# Patient Record
Sex: MALE | Race: OTHER | Employment: OTHER | URBAN - METROPOLITAN AREA
[De-identification: names, ages, dates, MRNs, and addresses within clinical notes are randomized per-mention and may not be internally consistent; named-entity substitution may affect disease eponyms.]

---

## 2022-11-28 NOTE — PROCEDURE NOTE: CLINICAL
PROCEDURE NOTE: Removal of Corneal FB at Slit Lamp OS. Diagnosis: Acute Pain Due to Trauma. Anesthesia: Topical. The patient, the procedure, and the correct site were identified initially. After the risks, benefits, and alternatives to the procedure were explained to the patient, informed consent was obtained. Prior to treatment, the risks/benefits/alternatives were discussed. The patient wished to proceed with procedure. Corneal foreign body was removed using a 25 gauge needle at the slit lamp. Patient tolerated procedure well. There were no complications. Post-op instructions given. Ramon Lott

## 2024-08-19 ENCOUNTER — CONSULTATION/EVALUATION (OUTPATIENT)
Dept: URBAN - METROPOLITAN AREA CLINIC 39 | Facility: CLINIC | Age: 79
End: 2024-08-19

## 2024-08-19 DIAGNOSIS — H25.813: ICD-10-CM

## 2024-08-19 DIAGNOSIS — E11.9: ICD-10-CM

## 2024-08-19 DIAGNOSIS — H35.363: ICD-10-CM

## 2024-08-19 PROCEDURE — 99214 OFFICE O/P EST MOD 30 MIN: CPT

## 2024-08-19 PROCEDURE — 92134 CPTRZ OPH DX IMG PST SGM RTA: CPT

## 2024-08-19 PROCEDURE — 92136 OPHTHALMIC BIOMETRY: CPT

## 2024-08-19 RX ORDER — MOXIFLOXACIN OPHTHALMIC 5 MG/ML: 1 SOLUTION/ DROPS OPHTHALMIC

## 2024-08-19 RX ORDER — PREDNISOLONE ACETATE 10 MG/ML: 1 SUSPENSION/ DROPS OPHTHALMIC

## 2024-08-19 RX ORDER — KETOROLAC TROMETHAMINE 5 MG/ML: 1 SOLUTION OPHTHALMIC

## 2024-08-19 ASSESSMENT — VISUAL ACUITY
OS_SC: 20/60
OD_CC: J<12
OS_CC: 20/40-2
OD_SC: J<12
OS_SC: J10
OS_CC: J6

## 2024-08-19 ASSESSMENT — TONOMETRY
OD_IOP_MMHG: 14
OS_IOP_MMHG: 14

## 2024-08-23 ENCOUNTER — CONSULTATION/EVALUATION (OUTPATIENT)
Dept: URBAN - METROPOLITAN AREA CLINIC 39 | Facility: CLINIC | Age: 79
End: 2024-08-23

## 2024-08-23 DIAGNOSIS — H25.812: ICD-10-CM

## 2024-08-23 DIAGNOSIS — H35.033: ICD-10-CM

## 2024-08-23 DIAGNOSIS — H35.3121: ICD-10-CM

## 2024-08-23 DIAGNOSIS — H01.003: ICD-10-CM

## 2024-08-23 DIAGNOSIS — H35.363: ICD-10-CM

## 2024-08-23 DIAGNOSIS — H01.006: ICD-10-CM

## 2024-08-23 DIAGNOSIS — H25.813: ICD-10-CM

## 2024-08-23 DIAGNOSIS — H35.3211: ICD-10-CM

## 2024-08-23 DIAGNOSIS — E11.9: ICD-10-CM

## 2024-08-23 PROCEDURE — 92242 FLUORESCEIN&ICG ANGIOGRAPHY: CPT

## 2024-08-23 PROCEDURE — 99214 OFFICE O/P EST MOD 30 MIN: CPT | Mod: 25

## 2024-08-23 PROCEDURE — 92250 FUNDUS PHOTOGRAPHY W/I&R: CPT

## 2024-08-23 PROCEDURE — 92134 CPTRZ OPH DX IMG PST SGM RTA: CPT

## 2024-08-23 PROCEDURE — 67028 INJECTION EYE DRUG: CPT

## 2024-08-23 PROCEDURE — J3490AVA AVASTIN *

## 2024-08-23 ASSESSMENT — VISUAL ACUITY
OS_CC: 20/40-1
OD_CC: CF 2FT

## 2024-08-23 ASSESSMENT — TONOMETRY
OS_IOP_MMHG: 13
OD_IOP_MMHG: 14

## 2024-10-22 ENCOUNTER — CLINIC PROCEDURE ONLY (OUTPATIENT)
Dept: URBAN - METROPOLITAN AREA CLINIC 39 | Facility: CLINIC | Age: 79
End: 2024-10-22

## 2024-10-22 DIAGNOSIS — H35.731: ICD-10-CM

## 2024-10-22 DIAGNOSIS — H35.3211: ICD-10-CM

## 2024-10-22 PROCEDURE — 67028 INJECTION EYE DRUG: CPT

## 2024-10-22 PROCEDURE — 92134 CPTRZ OPH DX IMG PST SGM RTA: CPT

## 2024-10-22 PROCEDURE — J3490AVA AVASTIN *

## 2024-12-03 ENCOUNTER — FOLLOW UP WITH CLINIC PROCEDURE (OUTPATIENT)
Age: 79
End: 2024-12-03

## 2024-12-03 DIAGNOSIS — H35.722: ICD-10-CM

## 2024-12-03 DIAGNOSIS — H35.731: ICD-10-CM

## 2024-12-03 DIAGNOSIS — H35.033: ICD-10-CM

## 2024-12-03 DIAGNOSIS — H35.3211: ICD-10-CM

## 2024-12-03 DIAGNOSIS — H35.363: ICD-10-CM

## 2024-12-03 DIAGNOSIS — H35.3121: ICD-10-CM

## 2024-12-03 DIAGNOSIS — H35.09: ICD-10-CM

## 2024-12-03 PROCEDURE — 92273 FULL FIELD ERG W/I&R: CPT

## 2024-12-03 PROCEDURE — 99213 OFFICE O/P EST LOW 20 MIN: CPT | Mod: 25

## 2024-12-03 PROCEDURE — 92250 FUNDUS PHOTOGRAPHY W/I&R: CPT

## 2024-12-03 PROCEDURE — 67028 INJECTION EYE DRUG: CPT

## 2024-12-03 PROCEDURE — J3490AVA AVASTIN *: Mod: JZ,RT

## 2024-12-12 ENCOUNTER — CONSULTATION/EVALUATION (OUTPATIENT)
Age: 79
End: 2024-12-12

## 2024-12-12 DIAGNOSIS — H35.722: ICD-10-CM

## 2024-12-12 DIAGNOSIS — H25.812: ICD-10-CM

## 2024-12-12 DIAGNOSIS — H35.09: ICD-10-CM

## 2024-12-12 DIAGNOSIS — H25.813: ICD-10-CM

## 2024-12-12 DIAGNOSIS — H35.363: ICD-10-CM

## 2024-12-12 DIAGNOSIS — H35.731: ICD-10-CM

## 2024-12-12 DIAGNOSIS — H35.033: ICD-10-CM

## 2024-12-12 DIAGNOSIS — H35.3121: ICD-10-CM

## 2024-12-12 DIAGNOSIS — H35.3211: ICD-10-CM

## 2024-12-12 PROCEDURE — 92136 OPHTHALMIC BIOMETRY: CPT

## 2024-12-12 PROCEDURE — 99214 OFFICE O/P EST MOD 30 MIN: CPT

## 2024-12-18 ENCOUNTER — SURGERY/PROCEDURE (OUTPATIENT)
Age: 79
End: 2024-12-18

## 2024-12-18 ENCOUNTER — PRE-OP/H&P (OUTPATIENT)
Age: 79
End: 2024-12-18

## 2024-12-18 DIAGNOSIS — H35.3211: ICD-10-CM

## 2024-12-18 DIAGNOSIS — H35.033: ICD-10-CM

## 2024-12-18 DIAGNOSIS — H25.813: ICD-10-CM

## 2024-12-18 DIAGNOSIS — H35.722: ICD-10-CM

## 2024-12-18 DIAGNOSIS — H35.3121: ICD-10-CM

## 2024-12-18 DIAGNOSIS — H25.811: ICD-10-CM

## 2024-12-18 DIAGNOSIS — H21.81: ICD-10-CM

## 2024-12-18 DIAGNOSIS — H35.09: ICD-10-CM

## 2024-12-18 DIAGNOSIS — H35.731: ICD-10-CM

## 2024-12-18 DIAGNOSIS — H25.812: ICD-10-CM

## 2024-12-18 DIAGNOSIS — H35.363: ICD-10-CM

## 2024-12-18 PROCEDURE — 99211T TECH SERVICE

## 2024-12-18 PROCEDURE — 66984 XCAPSL CTRC RMVL W/O ECP: CPT

## 2024-12-19 ENCOUNTER — POST-OP (OUTPATIENT)
Age: 79
End: 2024-12-19

## 2024-12-19 DIAGNOSIS — Z96.1: ICD-10-CM

## 2024-12-19 PROCEDURE — 99024 POSTOP FOLLOW-UP VISIT: CPT

## 2025-01-09 ENCOUNTER — PRE-OP/H&P (OUTPATIENT)
Age: 80
End: 2025-01-09

## 2025-01-09 ENCOUNTER — SURGERY/PROCEDURE (OUTPATIENT)
Age: 80
End: 2025-01-09

## 2025-01-09 DIAGNOSIS — H25.812: ICD-10-CM

## 2025-01-09 DIAGNOSIS — Z96.1: ICD-10-CM

## 2025-01-09 PROCEDURE — 66984 XCAPSL CTRC RMVL W/O ECP: CPT | Mod: 79,LT

## 2025-01-09 PROCEDURE — 99211T TECH SERVICE

## 2025-01-09 RX ORDER — PREDNISOLONE ACETATE 10 MG/ML: 1 SUSPENSION/ DROPS OPHTHALMIC

## 2025-01-10 ENCOUNTER — POST-OP (OUTPATIENT)
Age: 80
End: 2025-01-10

## 2025-01-10 DIAGNOSIS — Z96.1: ICD-10-CM

## 2025-01-10 PROCEDURE — 99024 POSTOP FOLLOW-UP VISIT: CPT

## 2025-01-28 ENCOUNTER — COMPREHENSIVE EXAM (OUTPATIENT)
Age: 80
End: 2025-01-28

## 2025-01-28 DIAGNOSIS — H35.722: ICD-10-CM

## 2025-01-28 DIAGNOSIS — H35.3211: ICD-10-CM

## 2025-01-28 DIAGNOSIS — H43.813: ICD-10-CM

## 2025-01-28 DIAGNOSIS — H35.033: ICD-10-CM

## 2025-01-28 DIAGNOSIS — Z96.1: ICD-10-CM

## 2025-01-28 DIAGNOSIS — H35.731: ICD-10-CM

## 2025-01-28 DIAGNOSIS — H35.09: ICD-10-CM

## 2025-01-28 DIAGNOSIS — H35.363: ICD-10-CM

## 2025-01-28 DIAGNOSIS — H35.3121: ICD-10-CM

## 2025-01-28 PROCEDURE — 67028 INJECTION EYE DRUG: CPT

## 2025-01-28 PROCEDURE — 99214 OFFICE O/P EST MOD 30 MIN: CPT | Mod: 24,25

## 2025-01-28 PROCEDURE — J0178PRE EYLEA PREFILLED SYRINGE: Mod: JZ,RT

## 2025-01-28 PROCEDURE — 92134 CPTRZ OPH DX IMG PST SGM RTA: CPT

## 2025-01-31 ENCOUNTER — POST-OP (OUTPATIENT)
Age: 80
End: 2025-01-31

## 2025-01-31 DIAGNOSIS — Z96.1: ICD-10-CM

## 2025-01-31 PROCEDURE — 99024 POSTOP FOLLOW-UP VISIT: CPT

## 2025-03-11 ENCOUNTER — CLINIC PROCEDURE ONLY (OUTPATIENT)
Age: 80
End: 2025-03-11

## 2025-03-11 DIAGNOSIS — H35.033: ICD-10-CM

## 2025-03-11 DIAGNOSIS — H35.3211: ICD-10-CM

## 2025-03-11 PROCEDURE — 92134 CPTRZ OPH DX IMG PST SGM RTA: CPT

## 2025-03-11 PROCEDURE — 67028 INJECTION EYE DRUG: CPT

## 2025-03-11 PROCEDURE — J0178PRE EYLEA PREFILLED SYRINGE: Mod: JZ,RT

## 2025-05-02 ENCOUNTER — FOLLOW UP (OUTPATIENT)
Age: 80
End: 2025-05-02

## 2025-05-02 DIAGNOSIS — H35.033: ICD-10-CM

## 2025-05-02 DIAGNOSIS — E11.9: ICD-10-CM

## 2025-05-02 DIAGNOSIS — H35.3211: ICD-10-CM

## 2025-05-02 DIAGNOSIS — H35.3121: ICD-10-CM

## 2025-05-02 DIAGNOSIS — H43.813: ICD-10-CM

## 2025-05-02 DIAGNOSIS — H35.722: ICD-10-CM

## 2025-05-02 DIAGNOSIS — H35.731: ICD-10-CM

## 2025-05-02 PROCEDURE — 67028 INJECTION EYE DRUG: CPT

## 2025-05-02 PROCEDURE — 92273 FULL FIELD ERG W/I&R: CPT

## 2025-05-02 PROCEDURE — 99214 OFFICE O/P EST MOD 30 MIN: CPT | Mod: 25

## 2025-05-02 PROCEDURE — 92242 FLUORESCEIN&ICG ANGIOGRAPHY: CPT

## 2025-05-02 PROCEDURE — J2777PFS VABYSMO PFS: Mod: JZ,RT

## 2025-05-02 PROCEDURE — 92134 CPTRZ OPH DX IMG PST SGM RTA: CPT

## 2025-07-01 ENCOUNTER — CLINIC PROCEDURE ONLY (OUTPATIENT)
Age: 80
End: 2025-07-01

## 2025-07-01 DIAGNOSIS — H35.3211: ICD-10-CM

## 2025-07-01 DIAGNOSIS — H35.731: ICD-10-CM

## 2025-07-01 PROCEDURE — J2777PFS VABYSMO PFS: Mod: JZ,RT

## 2025-07-01 PROCEDURE — 67028 INJECTION EYE DRUG: CPT

## 2025-07-01 PROCEDURE — 92250 FUNDUS PHOTOGRAPHY W/I&R: CPT
